# Patient Record
Sex: MALE | URBAN - METROPOLITAN AREA
[De-identification: names, ages, dates, MRNs, and addresses within clinical notes are randomized per-mention and may not be internally consistent; named-entity substitution may affect disease eponyms.]

---

## 2020-11-13 ENCOUNTER — OFFICE VISIT (OUTPATIENT)
Dept: URBAN - METROPOLITAN AREA CLINIC 22 | Facility: CLINIC | Age: 20
End: 2020-11-13
Payer: COMMERCIAL

## 2020-11-13 DIAGNOSIS — H43.392 OTHER VITREOUS OPACITIES, LEFT EYE: Primary | ICD-10-CM

## 2020-11-13 PROCEDURE — 99204 OFFICE O/P NEW MOD 45 MIN: CPT | Performed by: OPTOMETRIST

## 2020-11-13 ASSESSMENT — INTRAOCULAR PRESSURE
OD: 19
OS: 14

## 2020-11-17 ENCOUNTER — OFFICE VISIT (OUTPATIENT)
Dept: URBAN - METROPOLITAN AREA CLINIC 22 | Facility: CLINIC | Age: 20
End: 2020-11-17
Payer: COMMERCIAL

## 2020-11-17 DIAGNOSIS — H52.13 MYOPIA, BILATERAL: Primary | ICD-10-CM

## 2020-11-17 PROCEDURE — 92014 COMPRE OPH EXAM EST PT 1/>: CPT | Performed by: OPTOMETRIST

## 2020-11-17 PROCEDURE — 92015 DETERMINE REFRACTIVE STATE: CPT | Performed by: OPTOMETRIST

## 2020-11-17 PROCEDURE — 92310 CONTACT LENS FITTING OU: CPT | Performed by: OPTOMETRIST

## 2020-11-17 ASSESSMENT — VISUAL ACUITY
OD: 20/20
OS: 20/20

## 2020-11-17 ASSESSMENT — INTRAOCULAR PRESSURE
OS: 15
OD: 18

## 2020-11-17 ASSESSMENT — KERATOMETRY
OS: 44.02
OD: 43.74

## 2020-11-17 NOTE — IMPRESSION/PLAN
Impression: Myopia, bilateral: H52.13. Bilateral. Plan: Rx new prescription for glasses and contacts, fill as directed.